# Patient Record
Sex: MALE | Race: WHITE | Employment: FULL TIME | URBAN - NONMETROPOLITAN AREA
[De-identification: names, ages, dates, MRNs, and addresses within clinical notes are randomized per-mention and may not be internally consistent; named-entity substitution may affect disease eponyms.]

---

## 2012-07-16 LAB — PROSTATE SPECIFIC ANTIGEN: 0.74 NG/ML

## 2012-10-15 LAB — PROSTATE SPECIFIC ANTIGEN: 0.75 NG/ML

## 2013-01-08 LAB — PROSTATE SPECIFIC ANTIGEN: 0.67 NG/ML

## 2013-07-03 LAB — PROSTATE SPECIFIC ANTIGEN: 0.74 NG/ML

## 2014-01-08 LAB — PROSTATE SPECIFIC ANTIGEN: 0.69 NG/ML

## 2015-01-06 LAB — PROSTATE SPECIFIC ANTIGEN: 0.77 NG/ML

## 2018-01-17 ENCOUNTER — TELEPHONE (OUTPATIENT)
Dept: UROLOGY | Age: 53
End: 2018-01-17

## 2018-01-17 DIAGNOSIS — N20.0 NEPHROLITHIASIS: Primary | ICD-10-CM

## 2018-01-17 NOTE — TELEPHONE ENCOUNTER
Patient notified and voiced understanding. KUB order faxed to [facility] [phone].

## 2018-01-30 ENCOUNTER — TELEPHONE (OUTPATIENT)
Dept: UROLOGY | Age: 53
End: 2018-01-30

## 2018-01-30 NOTE — TELEPHONE ENCOUNTER
Yanni Busby would like to talk to [facility] regarding this patient sometime today. He will be out of the office from noon till one otherwise anytime. Please call their office at [phone].   thanks

## 2018-02-19 ENCOUNTER — OFFICE VISIT (OUTPATIENT)
Dept: UROLOGY | Age: 53
End: 2018-02-19
Payer: COMMERCIAL

## 2018-02-19 VITALS
BODY MASS INDEX: 30.49 KG/M2 | DIASTOLIC BLOOD PRESSURE: 80 MMHG | SYSTOLIC BLOOD PRESSURE: 128 MMHG | WEIGHT: 213 LBS | HEIGHT: 70 IN

## 2018-02-19 DIAGNOSIS — R10.9 FLANK PAIN: Primary | ICD-10-CM

## 2018-02-19 DIAGNOSIS — N13.30 HYDRONEPHROSIS, UNSPECIFIED HYDRONEPHROSIS TYPE: ICD-10-CM

## 2018-02-19 DIAGNOSIS — N20.0 KIDNEY STONES: ICD-10-CM

## 2018-02-19 LAB
BILIRUBIN URINE: NEGATIVE
BLOOD URINE, POC: NORMAL
CHARACTER, URINE: CLEAR
COLOR, URINE: YELLOW
GLUCOSE URINE: NEGATIVE MG/DL
KETONES, URINE: NEGATIVE
LEUKOCYTE CLUMPS, URINE: NEGATIVE
NITRITE, URINE: NEGATIVE
PH, URINE: 5
PROTEIN, URINE: NEGATIVE MG/DL
SPECIFIC GRAVITY, URINE: 1.02 (ref 1–1.03)
UROBILINOGEN, URINE: 0.2 EU/DL

## 2018-02-19 PROCEDURE — 81003 URINALYSIS AUTO W/O SCOPE: CPT | Performed by: UROLOGY

## 2018-02-19 PROCEDURE — 99244 OFF/OP CNSLTJ NEW/EST MOD 40: CPT | Performed by: UROLOGY

## 2018-02-19 ASSESSMENT — ENCOUNTER SYMPTOMS
EYE REDNESS: 0
ABDOMINAL PAIN: 0
BACK PAIN: 1
EYE PAIN: 0
FACIAL SWELLING: 0
NAUSEA: 0
CHEST TIGHTNESS: 0
COLOR CHANGE: 0
SHORTNESS OF BREATH: 0

## 2018-02-23 LAB — CREAT SERPL-MCNC: 1.2 MG/DL

## 2018-03-01 ENCOUNTER — HOSPITAL ENCOUNTER (OUTPATIENT)
Dept: NUCLEAR MEDICINE | Age: 53
Discharge: HOME | End: 2018-03-01
Payer: COMMERCIAL

## 2018-03-01 DIAGNOSIS — R10.9 FLANK PAIN: ICD-10-CM

## 2018-03-01 PROCEDURE — 6360000002 HC RX W HCPCS

## 2018-03-01 PROCEDURE — 3430000000 HC RX DIAGNOSTIC RADIOPHARMACEUTICAL: Performed by: UROLOGY

## 2018-03-01 PROCEDURE — A9562 TC99M MERTIATIDE: HCPCS | Performed by: UROLOGY

## 2018-03-01 PROCEDURE — 78708 K FLOW/FUNCT IMAGE W/DRUG: CPT

## 2018-03-01 RX ORDER — FUROSEMIDE 10 MG/ML
40 INJECTION INTRAMUSCULAR; INTRAVENOUS ONCE
Status: COMPLETED | OUTPATIENT
Start: 2018-03-01 | End: 2018-03-01

## 2018-03-01 RX ADMIN — FUROSEMIDE 40 MG: 10 INJECTION INTRAMUSCULAR; INTRAVENOUS at 13:20

## 2018-03-01 RX ADMIN — Medication 10.9 MILLICURIE: at 13:10

## 2018-03-14 ENCOUNTER — OFFICE VISIT (OUTPATIENT)
Dept: UROLOGY | Age: 53
End: 2018-03-14
Payer: COMMERCIAL

## 2018-03-14 VITALS
SYSTOLIC BLOOD PRESSURE: 128 MMHG | HEIGHT: 70 IN | BODY MASS INDEX: 30.35 KG/M2 | WEIGHT: 212 LBS | DIASTOLIC BLOOD PRESSURE: 80 MMHG

## 2018-03-14 DIAGNOSIS — N20.0 KIDNEY STONE: Primary | ICD-10-CM

## 2018-03-14 LAB
BILIRUBIN URINE: NEGATIVE
BLOOD URINE, POC: NORMAL
CHARACTER, URINE: CLEAR
COLOR, URINE: YELLOW
GLUCOSE URINE: NEGATIVE MG/DL
KETONES, URINE: NEGATIVE
LEUKOCYTE CLUMPS, URINE: NEGATIVE
NITRITE, URINE: NEGATIVE
PH, URINE: 6
PROTEIN, URINE: NEGATIVE MG/DL
SPECIFIC GRAVITY, URINE: 1.02 (ref 1–1.03)
UROBILINOGEN, URINE: 0.2 EU/DL

## 2018-03-14 PROCEDURE — 99213 OFFICE O/P EST LOW 20 MIN: CPT | Performed by: NURSE PRACTITIONER

## 2018-03-14 PROCEDURE — 81003 URINALYSIS AUTO W/O SCOPE: CPT | Performed by: NURSE PRACTITIONER

## 2018-03-14 NOTE — PROGRESS NOTES
demonstrates no evidence of ureteral obstruction. Assessment & Plan  Possible R renal stone  R flank pain, resolved. R renal cysts  Mild left pelvocaliectasis    Pt's flank pain has resolved. Renal lasix scan without evidence of obstruction bilaterally. We discussed general stone prevention measures including increasing water intake to 3-4 liters per day to make 2.5 liters of urine per day, limiting animal protein intake to less than 9 ozs per day, watching sodium intake, and maintaining moderate calcium intake from food and not supplements with goal of 800-1200 mg/day. Pt provided with handout on kidney stone prevention. We discussed checking a litholink or repeat KUB in a year and pt declined at this time. F/u PRN.

## 2018-03-14 NOTE — PATIENT INSTRUCTIONS
resection of a large segment of bowel due to inflammatory bowel disease---you may have excessive oxalate absorption from the gut increasing your stone risk. Depending on the stone composition and or 24 hour urine tests, specific recommendations can be made to help prevent kidney stone formation in the future. · Obesity--Kidney stones are more common with obesity. Any weight reduction can be helpful. Do your best!!    Further testing:  · A 24 hour urine collection test called a Khadra Stewart may be ordered by your physician at your follow-up appointment in the office. This test analyzes the elements present in your urine that may increase your risk for kidney stone formation. It allows your medical provider to pinpoint what specific dietary changes or medicine changes can be made to prevent future stones. · Stone analysis can be done on stone fragments retrieved during your procedure or from fragments that you pass in your urine to aid in prevention of future stones.

## 2018-07-25 LAB
ALBUMIN SERPL-MCNC: 3 G/DL
ALP BLD-CCNC: 101 U/L
ALT SERPL-CCNC: 56 U/L
ANION GAP SERPL CALCULATED.3IONS-SCNC: 9 MMOL/L
AST SERPL-CCNC: 29 U/L
BILIRUB SERPL-MCNC: 1.1 MG/DL (ref 0.1–1.4)
BUN BLDV-MCNC: 14 MG/DL
CALCIUM SERPL-MCNC: 8.6 MG/DL
CHLORIDE BLD-SCNC: 108 MMOL/L
CO2: 29 MMOL/L
CREAT SERPL-MCNC: 1.4 MG/DL
GFR CALCULATED: NORMAL
GLUCOSE BLD-MCNC: 137 MG/DL
POTASSIUM SERPL-SCNC: 3.8 MMOL/L
PROSTATE SPECIFIC ANTIGEN: 29.12 NG/ML
SODIUM BLD-SCNC: 142 MMOL/L
TOTAL PROTEIN: 7

## 2018-08-01 LAB — PROSTATE SPECIFIC ANTIGEN: 10.06 NG/ML

## 2018-08-14 LAB — PROSTATE SPECIFIC ANTIGEN: 5.25 NG/ML

## 2018-09-17 LAB — PROSTATE SPECIFIC ANTIGEN: 5.38 NG/ML

## 2018-10-03 LAB
ALBUMIN SERPL-MCNC: 3.6 G/DL
ALP BLD-CCNC: 125 U/L
ALT SERPL-CCNC: 77 U/L
ANION GAP SERPL CALCULATED.3IONS-SCNC: 8 MMOL/L
AST SERPL-CCNC: 50 U/L
BILIRUB SERPL-MCNC: 0.7 MG/DL (ref 0.1–1.4)
BUN BLDV-MCNC: 12 MG/DL
CALCIUM SERPL-MCNC: 8.8 MG/DL
CHLORIDE BLD-SCNC: 103 MMOL/L
CHOLESTEROL, TOTAL: 173 MG/DL
CHOLESTEROL/HDL RATIO: NORMAL
CO2: 29 MMOL/L
CREAT SERPL-MCNC: 1.5 MG/DL
GFR CALCULATED: NORMAL
GLUCOSE BLD-MCNC: 126 MG/DL
HDLC SERPL-MCNC: 36 MG/DL (ref 35–70)
LDL CHOLESTEROL CALCULATED: 66 MG/DL (ref 0–160)
POTASSIUM SERPL-SCNC: 4.2 MMOL/L
PROSTATE SPECIFIC ANTIGEN: 3.43 NG/ML
SODIUM BLD-SCNC: 136 MMOL/L
TOTAL PROTEIN: 7.4
TRIGL SERPL-MCNC: 355 MG/DL
VLDLC SERPL CALC-MCNC: 71 MG/DL

## 2018-10-11 ENCOUNTER — TELEPHONE (OUTPATIENT)
Dept: UROLOGY | Age: 53
End: 2018-10-11

## 2018-10-23 ENCOUNTER — OFFICE VISIT (OUTPATIENT)
Dept: UROLOGY | Age: 53
End: 2018-10-23
Payer: COMMERCIAL

## 2018-10-23 VITALS
DIASTOLIC BLOOD PRESSURE: 80 MMHG | WEIGHT: 211 LBS | SYSTOLIC BLOOD PRESSURE: 126 MMHG | BODY MASS INDEX: 30.21 KG/M2 | HEIGHT: 70 IN

## 2018-10-23 DIAGNOSIS — R97.20 ELEVATED PSA: ICD-10-CM

## 2018-10-23 DIAGNOSIS — R39.15 URINARY URGENCY: Primary | ICD-10-CM

## 2018-10-23 DIAGNOSIS — R31.29 MICROSCOPIC HEMATURIA: ICD-10-CM

## 2018-10-23 LAB
BACTERIA: NORMAL
BILIRUBIN URINE: NEGATIVE
BILIRUBIN URINE: NEGATIVE
BLOOD URINE, POC: NORMAL
BLOOD, URINE: NEGATIVE
CASTS: NORMAL /LPF
CASTS: NORMAL /LPF
CHARACTER, URINE: CLEAR
CHARACTER, URINE: CLEAR
COLOR, URINE: YELLOW
COLOR: YELLOW
CRYSTALS: NORMAL
EPITHELIAL CELLS, UA: NORMAL /HPF
GLUCOSE URINE: NEGATIVE MG/DL
GLUCOSE, URINE: NEGATIVE MG/DL
KETONES, URINE: NEGATIVE
KETONES, URINE: NEGATIVE
LEUKOCYTE CLUMPS, URINE: NEGATIVE
LEUKOCYTE ESTERASE, URINE: NEGATIVE
MISCELLANEOUS LAB TEST RESULT: NORMAL
NITRITE, URINE: NEGATIVE
NITRITE, URINE: NEGATIVE
PH UA: 6
PH, URINE: 6.5
POST VOID RESIDUAL (PVR): 0 ML
PROTEIN UA: NEGATIVE MG/DL
PROTEIN, URINE: NEGATIVE MG/DL
RBC URINE: NORMAL /HPF
RENAL EPITHELIAL, UA: NORMAL
SPECIFIC GRAVITY UA: 1.02 (ref 1–1.03)
SPECIFIC GRAVITY, URINE: 1.02 (ref 1–1.03)
UROBILINOGEN, URINE: 0.2 EU/DL
UROBILINOGEN, URINE: 0.2 EU/DL
WBC UA: NORMAL /HPF
YEAST: NORMAL

## 2018-10-23 PROCEDURE — 81003 URINALYSIS AUTO W/O SCOPE: CPT | Performed by: NURSE PRACTITIONER

## 2018-10-23 PROCEDURE — 51798 US URINE CAPACITY MEASURE: CPT | Performed by: NURSE PRACTITIONER

## 2018-10-23 PROCEDURE — 99214 OFFICE O/P EST MOD 30 MIN: CPT | Performed by: NURSE PRACTITIONER

## 2019-02-05 ENCOUNTER — TELEPHONE (OUTPATIENT)
Dept: UROLOGY | Age: 54
End: 2019-02-05

## 2019-02-05 LAB — PROSTATE SPECIFIC ANTIGEN: 1.92 NG/ML

## 2019-04-09 LAB
ALBUMIN SERPL-MCNC: 3.7 G/DL
ALP BLD-CCNC: 105 U/L
ALT SERPL-CCNC: 79 U/L
ANION GAP SERPL CALCULATED.3IONS-SCNC: 10 MMOL/L
AST SERPL-CCNC: 47 U/L
BILIRUB SERPL-MCNC: 0.9 MG/DL (ref 0.1–1.4)
BUN BLDV-MCNC: 17 MG/DL
CALCIUM SERPL-MCNC: 9.3 MG/DL
CHLORIDE BLD-SCNC: 103 MMOL/L
CHOLESTEROL, TOTAL: 178 MG/DL
CHOLESTEROL/HDL RATIO: NORMAL
CO2: 28 MMOL/L
CREAT SERPL-MCNC: 1.2 MG/DL
GFR CALCULATED: 67
GLUCOSE BLD-MCNC: 116 MG/DL
HDLC SERPL-MCNC: 42 MG/DL (ref 35–70)
LDL CHOLESTEROL CALCULATED: 120 MG/DL (ref 0–160)
POTASSIUM SERPL-SCNC: 4.4 MMOL/L
PROSTATE SPECIFIC ANTIGEN: 2.29 NG/ML
SODIUM BLD-SCNC: 137 MMOL/L
TOTAL PROTEIN: 7.5
TRIGL SERPL-MCNC: 82 MG/DL
VLDLC SERPL CALC-MCNC: 16 MG/DL

## 2019-04-30 ENCOUNTER — OFFICE VISIT (OUTPATIENT)
Dept: UROLOGY | Age: 54
End: 2019-04-30
Payer: COMMERCIAL

## 2019-04-30 VITALS
BODY MASS INDEX: 30.95 KG/M2 | HEIGHT: 70 IN | DIASTOLIC BLOOD PRESSURE: 70 MMHG | SYSTOLIC BLOOD PRESSURE: 122 MMHG | WEIGHT: 216.2 LBS

## 2019-04-30 DIAGNOSIS — R39.15 URINARY URGENCY: Primary | ICD-10-CM

## 2019-04-30 DIAGNOSIS — R97.20 ELEVATED PSA: ICD-10-CM

## 2019-04-30 LAB
BILIRUBIN URINE: NEGATIVE
BLOOD URINE, POC: NEGATIVE
CHARACTER, URINE: CLEAR
COLOR, URINE: YELLOW
GLUCOSE URINE: NEGATIVE MG/DL
KETONES, URINE: NEGATIVE
LEUKOCYTE CLUMPS, URINE: NEGATIVE
NITRITE, URINE: NEGATIVE
PH, URINE: 7 (ref 5–9)
POST VOID RESIDUAL (PVR): 0 ML
PROTEIN, URINE: NEGATIVE MG/DL
SPECIFIC GRAVITY, URINE: 1.01 (ref 1–1.03)
UROBILINOGEN, URINE: 0.2 EU/DL (ref 0–1)

## 2019-04-30 PROCEDURE — 81003 URINALYSIS AUTO W/O SCOPE: CPT | Performed by: NURSE PRACTITIONER

## 2019-04-30 PROCEDURE — 99213 OFFICE O/P EST LOW 20 MIN: CPT | Performed by: NURSE PRACTITIONER

## 2019-04-30 PROCEDURE — 51798 US URINE CAPACITY MEASURE: CPT | Performed by: NURSE PRACTITIONER

## 2019-04-30 RX ORDER — LOSARTAN POTASSIUM 100 MG/1
100 TABLET ORAL DAILY
Refills: 1 | COMMUNITY
Start: 2019-03-28

## 2019-04-30 RX ORDER — AMLODIPINE BESYLATE 5 MG/1
5 TABLET ORAL DAILY
Refills: 0 | COMMUNITY
Start: 2019-04-15

## 2019-04-30 RX ORDER — LORATADINE 10 MG/1
10 TABLET ORAL DAILY
COMMUNITY

## 2019-04-30 NOTE — PROGRESS NOTES
4.00 years of use. He has never used smokeless tobacco. He reports that he does not drink alcohol or use drugs. Review of Systems  No problems with ears, nose or throat. No problems with eyes. No chest pain, shortness of breath, abdominal pain, extremity pain or weakness, and no neurological deficits. No rashes. No swollen glands or lymph nodes.  symptoms per HPI. The remainder of the review of symptoms is negative. Exam  There were no vitals taken for this visit. Nursing note and vitals reviewed. Constitutional: Alert and oriented times 3, no acute distress and cooperative to examination with appropriate mood and affect. HENT:   Head:        Normocephalic and atraumatic. Mouth/Throat:         Mucous membranes are normal.   Eyes:         EOM are normal. No scleral icterus. PERRLA. Neck:        Supple, symmetrical, trachea midline, no adenopathy, thyroid symmetric, not enlarged and no tenderness. Cardiovascular:        Normal rate, regular rhythm, S1 S2 heart sounds. No murmurs, rub, or gallops. Pulses:       Radial pulses are 2+/4 bilateral and equal. Posterior tibialis 2+/4 bilateral and equal  Pulmonary/Chest:      Chest symmetric with normal A/P diameter,  CTA with no wheezes, rales, or rhonchi noted. Normal respiratory rate and rhthym. No use of accessory muscles. Abdominal:         Soft. No tenderness. No rebound, no guarding and no CVA tenderness. Bowel sounds present. :  Prostate mild to moderately enlarged without nodule or induration  Musculoskeletal:         Normal range of motion. No edema or tenderness of lower extremities. Lymphadenopathy:        No cervical adenopathy. Bilateral supraclavicular adenopathy absent. Extremities: No cyanosis, clubbing, or edema present. Neurological:        Alert and oriented. No cranial nerve deficit. There are no focalizing motor or sensory deficits. CN II-XII are grossly intact.     Psychiatric:        Normal mood and affect. Labs   Urine dip demonstrates   No results found for this visit on 04/30/19. Patients recent PSA values are as follows  Lab Results   Component Value Date    PSA 2.29 04/09/2019    PSA 1.92 02/05/2019    PSA 3.43 10/03/2018            Recent BUN/Creatinine:  Lab Results   Component Value Date    BUN 17 04/09/2019    CREATININE 1.2 04/09/2019     Assessment & Plan  Prostatitis  Elevated PSA  R renal cysts    SYLVIE without nodule or induration. PSA down overall. Continue to follow every 6 months. F/u in 1 year with PVR. PSA every 6 months.

## 2024-01-15 ENCOUNTER — HOSPITAL ENCOUNTER (OUTPATIENT)
Dept: GENERAL RADIOLOGY | Age: 59
Discharge: HOME | End: 2024-01-15

## 2024-01-15 ENCOUNTER — HOSPITAL ENCOUNTER (OUTPATIENT)
Dept: CT IMAGING | Age: 59
Discharge: HOME | End: 2024-01-15
Attending: RADIOLOGY

## 2024-01-15 DIAGNOSIS — Z00.6 ENCOUNTER FOR EXAMINATION FOR NORMAL COMPARISON OR CONTROL IN CLINICAL RESEARCH PROGRAM: ICD-10-CM

## 2024-01-16 ENCOUNTER — TELEPHONE (OUTPATIENT)
Dept: PULMONOLOGY | Age: 59
End: 2024-01-16

## 2024-01-16 ENCOUNTER — OFFICE VISIT (OUTPATIENT)
Dept: PULMONOLOGY | Age: 59
End: 2024-01-16
Payer: COMMERCIAL

## 2024-01-16 VITALS
OXYGEN SATURATION: 98 % | BODY MASS INDEX: 31.81 KG/M2 | SYSTOLIC BLOOD PRESSURE: 128 MMHG | WEIGHT: 222.2 LBS | HEART RATE: 77 BPM | TEMPERATURE: 97.9 F | DIASTOLIC BLOOD PRESSURE: 72 MMHG | HEIGHT: 70 IN

## 2024-01-16 DIAGNOSIS — U07.1 PNEUMONIA DUE TO 2019-NCOV: ICD-10-CM

## 2024-01-16 DIAGNOSIS — R05.9 COUGH IN ADULT: Primary | ICD-10-CM

## 2024-01-16 DIAGNOSIS — R93.89 ABNORMAL CT OF THE CHEST: ICD-10-CM

## 2024-01-16 DIAGNOSIS — J12.82 PNEUMONIA DUE TO 2019-NCOV: ICD-10-CM

## 2024-01-16 PROCEDURE — 99204 OFFICE O/P NEW MOD 45 MIN: CPT | Performed by: INTERNAL MEDICINE

## 2024-01-16 RX ORDER — OMEPRAZOLE 40 MG/1
40 CAPSULE, DELAYED RELEASE ORAL DAILY
Qty: 30 CAPSULE | Refills: 3 | Status: SHIPPED | OUTPATIENT
Start: 2024-01-16 | End: 2024-05-15

## 2024-01-16 RX ORDER — BUDESONIDE AND FORMOTEROL FUMARATE DIHYDRATE 80; 4.5 UG/1; UG/1
2 AEROSOL RESPIRATORY (INHALATION) 2 TIMES DAILY
COMMUNITY

## 2024-01-16 RX ORDER — FLUTICASONE PROPIONATE 50 MCG
2 SPRAY, SUSPENSION (ML) NASAL DAILY
Qty: 16 G | Refills: 3 | Status: SHIPPED | OUTPATIENT
Start: 2024-01-16

## 2024-01-16 NOTE — PROGRESS NOTES
[facility].  Pulmonary medicine clinic initial consult note.      Patient: Gab Santos  : 1965      Chief complaint/[place]: Gab Santos is a 59 y.o. old male came for further evaluation regarding his chronic cough and obstructive sleep apnea with referral from Dr. Colin Mai MD    He is having cough: {YES/NO:::\"Yes\"}  Duration of cough: for {NUMBERS 1-30:100835209} days.   More than 8weeks: {YES/NO:::\"No\"}  History of post nasal drip: {YES/NO:::\"No\"}  History suggestive of GERD I.e bad tast in the mouth in the morning or heart burn: {YES/NO:::\"No\"}  History suggestive of aspiration/silent aspiration: {YES/NO:::\"No\"}  His cough is associated with sputum production: {YES/NO:::\"Yes\"}   The sputum color: {COLOR:0389646328::\"clear\"}  Hemoptysis:{YES/NO:::\"No\"}  Diurnal variation: None.     Worse {Time; morning/afternoon/evenin::\"in the morning\"}    Associated with fever: {YES/NO:::\"No\"}  Duration: {NUMBERS 0-12:57684} days.  Onset: {DESC; ONSET:5708}   Chills & rigors:{YES/NO:::\"No\"}  Associated night sweats: {YES/NO:::\"No\"}.  Recent travel history:{YES/NO:::\"No\"}.    Rrecent sick contacts: {YES/NO:::\"No\"}.   History of Atopy: {YES/NO:::\"No\"}.  History of exposure to occupational dust or chemicals:{YES/NO:::\"No\"}.       He is having shortness of breath:Yes  Onset: Gradual  Duration:{NUMBERS 0-12:41507}{DURATION:}.  Diurnal variation:  None.  Worse {Time; morning/afternoon/evenin::\"in the morning\"}  Functional status prior to beginning of symptoms: Distance he can walk on level ground: {NUMBERS; 100-1000 :85192} feet.  Current functional capacity on level ground: Distance he can walk on level ground: {NUMBERS; 100-1000 :42450} feet.  He can climb steps: {YES/NO:::\"No\"}  Flights of steps he can climb: {NUMBERS; 0-10:5044}  He gives a history of 
Neck Circumference -   17.5  Mallampati - 3    Lung Nodule Screening     [] Qualifies    [] Does not qualify   [] Declined    [] Completed  
NO  Claustrophobic: NO  MDD : NO  Blind: NO  Incontinent: NO  Para/Quadraplegi: NO.   Need transportation to and from Sleep Center:NO    Mallampati Score: 3   Neck Circumference:17.5 Inches.    He was diagnosed with obstructive sleep apnea in the past.  He did not want to go for reevaluation.  He is not interested in going for any treatment for his sleep apnea.    Social History:  Occupation:  He is current working: Yes  Type of profession: .                     Social History     Tobacco Use    Smoking status: Former     Current packs/day: 0.00     Types: Cigarettes     Start date: 1978     Quit date: 1982     Years since quittin.0    Smokeless tobacco: Never   Substance Use Topics    Alcohol use: No    Drug use: No       History of recreational or IV drug use in the past: NO  History of Alcohol use: No.       History of exposure to coal mines/coal dust: NO  History of exposure to foundry dust/welding: yes worked around automated welding for 22 years  History of exposure to quarry/silica/sandblasting: NO  History of exposure to asbestos/working with breaks/ships: NO  History of exposure to farm dust: NO  History of wood smoke exposure: Occasional. heats with indoor wood stove.   History of recent travel to long distances: NO  History of exposure to birds, pigeons, or chickens in the past:yes. Used to have chickens  Pet animals at home:Yes  Dogs: 3  Cats: 0    History of pulmonary embolism in the past: No            History of DVT in the past:No                               Review of Systems:   General/Constitutional: No recent loss of weight or appetite changes. No fever or chills.  HENT: Negative.   Eyes: Negative.  Upper respiratory tract: Occasional nasal stuffiness with post nasal drip.  Lower respiratory tract/ lungs: See HPI.  Cardiovascular: No palpitations or chest pain.  Gastrointestinal: No nausea or vomiting.  Neurological: No focal neurologiacal weakness.  Extremities: No

## 2024-01-16 NOTE — TELEPHONE ENCOUNTER
Pt is refusing testing at this time.  He is going to try and loose weight and treat the gerd and will F/U in 3 months @ [facility] office.

## 2024-04-26 RX ORDER — OMEPRAZOLE 40 MG/1
40 CAPSULE, DELAYED RELEASE ORAL DAILY
Qty: 30 CAPSULE | Refills: 0 | OUTPATIENT
Start: 2024-04-26

## 2024-04-30 RX ORDER — OMEPRAZOLE 40 MG/1
40 CAPSULE, DELAYED RELEASE ORAL DAILY
Qty: 30 CAPSULE | Refills: 0 | OUTPATIENT
Start: 2024-04-30

## 2024-05-14 RX ORDER — OMEPRAZOLE 40 MG/1
40 CAPSULE, DELAYED RELEASE ORAL DAILY
Qty: 30 CAPSULE | Refills: 0 | OUTPATIENT
Start: 2024-05-14

## 2024-05-14 NOTE — TELEPHONE ENCOUNTER
Received refill request for Omeprazole. Medication was last ordered by Dr. Grimm. Medication was last ordered on 1/16/24 with 1 refills.   Patient was last seen in the office 1/16/24. Patient has a scheduled follow up 5/22/24 w/Viktoriya.   Medication needs to be sent to [facility].

## 2024-05-22 ENCOUNTER — APPOINTMENT (OUTPATIENT)
Dept: CT IMAGING | Age: 59
End: 2024-05-22
Payer: COMMERCIAL

## 2024-05-22 ENCOUNTER — HOSPITAL ENCOUNTER (OUTPATIENT)
Age: 59
Setting detail: OBSERVATION
Discharge: HOME | End: 2024-05-24
Attending: HOSPITALIST | Admitting: HOSPITALIST
Payer: COMMERCIAL

## 2024-05-22 DIAGNOSIS — Z00.6 EXAMINATION FOR NORMAL COMPARISON FOR CLINICAL RESEARCH: ICD-10-CM

## 2024-05-22 DIAGNOSIS — R40.4 TRANSIENT ALTERATION OF AWARENESS: Primary | ICD-10-CM

## 2024-05-22 PROBLEM — R41.82 AMS (ALTERED MENTAL STATUS): Status: ACTIVE | Noted: 2024-05-22

## 2024-05-22 LAB
ANION GAP SERPL CALC-SCNC: 11 MEQ/L (ref 8–16)
BACTERIA: NORMAL
BASOPHILS ABSOLUTE: 0.1 THOU/MM3 (ref 0–0.1)
BASOPHILS NFR BLD AUTO: 1.2 %
BILIRUB UR QL STRIP: NEGATIVE
BUN SERPL-MCNC: 19 MG/DL (ref 7–22)
CALCIUM SERPL-MCNC: 9.7 MG/DL (ref 8.5–10.5)
CASTS #/AREA URNS LPF: NORMAL /LPF
CASTS #/AREA URNS LPF: NORMAL /LPF
CHARACTER UR: CLEAR
CHARCOAL URNS QL MICRO: NORMAL
CHLORIDE SERPL-SCNC: 99 MEQ/L (ref 98–111)
CK SERPL-CCNC: 547 U/L (ref 55–170)
CO2 SERPL-SCNC: 27 MEQ/L (ref 23–33)
COLOR UR: NORMAL
CREAT SERPL-MCNC: 1.1 MG/DL (ref 0.4–1.2)
CRYSTALS URNS QL MICRO: NORMAL
DEPRECATED RDW RBC AUTO: 44.1 FL (ref 35–45)
EKG ATRIAL RATE: 61 BPM
EKG P AXIS: 66 DEGREES
EKG P-R INTERVAL: 152 MS
EKG Q-T INTERVAL: 424 MS
EKG QRS DURATION: 122 MS
EKG QTC CALCULATION (BAZETT): 426 MS
EKG R AXIS: 87 DEGREES
EKG T AXIS: 67 DEGREES
EKG VENTRICULAR RATE: 61 BPM
EOSINOPHIL NFR BLD AUTO: 2.4 %
EOSINOPHILS ABSOLUTE: 0.1 THOU/MM3 (ref 0–0.4)
EPITHELIAL CELLS, UA: NORMAL /HPF
ERYTHROCYTE [DISTWIDTH] IN BLOOD BY AUTOMATED COUNT: 13.6 % (ref 11.5–14.5)
GFR SERPL CREATININE-BSD FRML MDRD: 77 ML/MIN/1.73M2
GLUCOSE BLD STRIP.AUTO-MCNC: 97 MG/DL (ref 70–108)
GLUCOSE SERPL-MCNC: 121 MG/DL (ref 70–108)
GLUCOSE UR QL STRIP.AUTO: NEGATIVE MG/DL
HCT VFR BLD AUTO: 45.8 % (ref 42–52)
HGB BLD-MCNC: 15.1 GM/DL (ref 14–18)
HGB UR QL STRIP.AUTO: NEGATIVE
IMM GRANULOCYTES # BLD AUTO: 0.03 THOU/MM3 (ref 0–0.07)
IMM GRANULOCYTES NFR BLD AUTO: 0.5 %
KETONES UR QL STRIP.AUTO: NEGATIVE
LEUKOCYTE ESTERASE UR QL STRIP.AUTO: NEGATIVE
LYMPHOCYTES ABSOLUTE: 1.2 THOU/MM3 (ref 1–4.8)
LYMPHOCYTES NFR BLD AUTO: 20.4 %
MCH RBC QN AUTO: 29.3 PG (ref 26–33)
MCHC RBC AUTO-ENTMCNC: 33 GM/DL (ref 32.2–35.5)
MCV RBC AUTO: 88.8 FL (ref 80–94)
MONOCYTES ABSOLUTE: 0.7 THOU/MM3 (ref 0.4–1.3)
MONOCYTES NFR BLD AUTO: 13.1 %
NEUTROPHILS ABSOLUTE: 3.6 THOU/MM3 (ref 1.8–7.7)
NEUTROPHILS NFR BLD AUTO: 62.4 %
NITRITE UR QL STRIP.AUTO: NEGATIVE
NRBC BLD AUTO-RTO: 0 /100 WBC
OSMOLALITY SERPL CALC.SUM OF ELEC: 277.3 MOSMOL/KG (ref 275–300)
PH UR STRIP.AUTO: 7 [PH] (ref 5–9)
PLATELET # BLD AUTO: 298 THOU/MM3 (ref 130–400)
PMV BLD AUTO: 10.1 FL (ref 9.4–12.4)
POTASSIUM SERPL-SCNC: 4 MEQ/L (ref 3.5–5.2)
PROLACTIN SERPL-MCNC: 9.1 NG/ML
PROT UR STRIP.AUTO-MCNC: NEGATIVE MG/DL
RBC # BLD AUTO: 5.16 MILL/MM3 (ref 4.7–6.1)
RBC #/AREA URNS HPF: NORMAL /HPF
RENAL EPI CELLS #/AREA URNS HPF: NORMAL /[HPF]
SODIUM SERPL-SCNC: 137 MEQ/L (ref 135–145)
SPECIFIC GRAVITY UA: 1.02 (ref 1–1.03)
TROPONIN, HIGH SENSITIVITY: 17 NG/L (ref 0–12)
TROPONIN, HIGH SENSITIVITY: 19 NG/L (ref 0–12)
TSH SERPL DL<=0.005 MIU/L-ACNC: 2.18 UIU/ML (ref 0.4–4.2)
UROBILINOGEN, URINE: 0.2 EU/DL (ref 0–1)
WBC # BLD AUTO: 5.7 THOU/MM3 (ref 4.8–10.8)
WBC #/AREA URNS HPF: NORMAL /HPF
YEAST LIKE FUNGI URNS QL MICRO: NORMAL

## 2024-05-22 PROCEDURE — 2580000003 HC RX 258

## 2024-05-22 PROCEDURE — 84484 ASSAY OF TROPONIN QUANT: CPT

## 2024-05-22 PROCEDURE — 36415 COLL VENOUS BLD VENIPUNCTURE: CPT

## 2024-05-22 PROCEDURE — G0378 HOSPITAL OBSERVATION PER HR: HCPCS

## 2024-05-22 PROCEDURE — 81001 URINALYSIS AUTO W/SCOPE: CPT

## 2024-05-22 PROCEDURE — 70450 CT HEAD/BRAIN W/O DYE: CPT

## 2024-05-22 PROCEDURE — 99223 1ST HOSP IP/OBS HIGH 75: CPT | Performed by: HOSPITALIST

## 2024-05-22 PROCEDURE — 93010 ELECTROCARDIOGRAM REPORT: CPT | Performed by: INTERNAL MEDICINE

## 2024-05-22 PROCEDURE — 95819 EEG AWAKE AND ASLEEP: CPT

## 2024-05-22 PROCEDURE — 82948 REAGENT STRIP/BLOOD GLUCOSE: CPT

## 2024-05-22 PROCEDURE — 99285 EMERGENCY DEPT VISIT HI MDM: CPT

## 2024-05-22 PROCEDURE — 80048 BASIC METABOLIC PNL TOTAL CA: CPT

## 2024-05-22 PROCEDURE — 84146 ASSAY OF PROLACTIN: CPT

## 2024-05-22 PROCEDURE — 6370000000 HC RX 637 (ALT 250 FOR IP)

## 2024-05-22 PROCEDURE — 95819 EEG AWAKE AND ASLEEP: CPT | Performed by: PSYCHIATRY & NEUROLOGY

## 2024-05-22 PROCEDURE — 84443 ASSAY THYROID STIM HORMONE: CPT

## 2024-05-22 PROCEDURE — 93005 ELECTROCARDIOGRAM TRACING: CPT | Performed by: NURSE PRACTITIONER

## 2024-05-22 PROCEDURE — 85025 COMPLETE CBC W/AUTO DIFF WBC: CPT

## 2024-05-22 PROCEDURE — 82550 ASSAY OF CK (CPK): CPT

## 2024-05-22 RX ORDER — ACETAMINOPHEN 650 MG/1
650 SUPPOSITORY RECTAL EVERY 6 HOURS PRN
Status: DISCONTINUED | OUTPATIENT
Start: 2024-05-22 | End: 2024-05-24 | Stop reason: HOSPADM

## 2024-05-22 RX ORDER — ONDANSETRON 2 MG/ML
4 INJECTION INTRAMUSCULAR; INTRAVENOUS EVERY 6 HOURS PRN
Status: DISCONTINUED | OUTPATIENT
Start: 2024-05-22 | End: 2024-05-24 | Stop reason: HOSPADM

## 2024-05-22 RX ORDER — ROSUVASTATIN CALCIUM 10 MG/1
5 TABLET, COATED ORAL NIGHTLY
Status: DISCONTINUED | OUTPATIENT
Start: 2024-05-22 | End: 2024-05-24 | Stop reason: HOSPADM

## 2024-05-22 RX ORDER — SODIUM CHLORIDE 9 MG/ML
INJECTION, SOLUTION INTRAVENOUS PRN
Status: DISCONTINUED | OUTPATIENT
Start: 2024-05-22 | End: 2024-05-24 | Stop reason: HOSPADM

## 2024-05-22 RX ORDER — POTASSIUM CHLORIDE 7.45 MG/ML
10 INJECTION INTRAVENOUS PRN
Status: DISCONTINUED | OUTPATIENT
Start: 2024-05-22 | End: 2024-05-24 | Stop reason: HOSPADM

## 2024-05-22 RX ORDER — ONDANSETRON 4 MG/1
4 TABLET, ORALLY DISINTEGRATING ORAL EVERY 8 HOURS PRN
Status: DISCONTINUED | OUTPATIENT
Start: 2024-05-22 | End: 2024-05-24 | Stop reason: HOSPADM

## 2024-05-22 RX ORDER — LOSARTAN POTASSIUM 100 MG/1
100 TABLET ORAL DAILY
Status: DISCONTINUED | OUTPATIENT
Start: 2024-05-23 | End: 2024-05-24 | Stop reason: HOSPADM

## 2024-05-22 RX ORDER — HYDROCHLOROTHIAZIDE 25 MG/1
25 TABLET ORAL DAILY
Status: DISCONTINUED | OUTPATIENT
Start: 2024-05-23 | End: 2024-05-24 | Stop reason: HOSPADM

## 2024-05-22 RX ORDER — ACETAMINOPHEN 325 MG/1
650 TABLET ORAL EVERY 6 HOURS PRN
Status: DISCONTINUED | OUTPATIENT
Start: 2024-05-22 | End: 2024-05-24 | Stop reason: HOSPADM

## 2024-05-22 RX ORDER — SODIUM CHLORIDE 0.9 % (FLUSH) 0.9 %
5-40 SYRINGE (ML) INJECTION PRN
Status: DISCONTINUED | OUTPATIENT
Start: 2024-05-22 | End: 2024-05-24 | Stop reason: HOSPADM

## 2024-05-22 RX ORDER — FLUTICASONE PROPIONATE 50 MCG
2 SPRAY, SUSPENSION (ML) NASAL DAILY
Status: DISCONTINUED | OUTPATIENT
Start: 2024-05-23 | End: 2024-05-24 | Stop reason: HOSPADM

## 2024-05-22 RX ORDER — ASPIRIN 81 MG/1
81 TABLET, CHEWABLE ORAL DAILY
Status: DISCONTINUED | OUTPATIENT
Start: 2024-05-22 | End: 2024-05-24 | Stop reason: HOSPADM

## 2024-05-22 RX ORDER — SODIUM CHLORIDE 0.9 % (FLUSH) 0.9 %
5-40 SYRINGE (ML) INJECTION EVERY 12 HOURS SCHEDULED
Status: DISCONTINUED | OUTPATIENT
Start: 2024-05-22 | End: 2024-05-24 | Stop reason: HOSPADM

## 2024-05-22 RX ORDER — POLYETHYLENE GLYCOL 3350 17 G/17G
17 POWDER, FOR SOLUTION ORAL DAILY PRN
Status: DISCONTINUED | OUTPATIENT
Start: 2024-05-22 | End: 2024-05-24 | Stop reason: HOSPADM

## 2024-05-22 RX ORDER — POTASSIUM CHLORIDE 20 MEQ/1
40 TABLET, EXTENDED RELEASE ORAL PRN
Status: DISCONTINUED | OUTPATIENT
Start: 2024-05-22 | End: 2024-05-24 | Stop reason: HOSPADM

## 2024-05-22 RX ORDER — MAGNESIUM SULFATE IN WATER 40 MG/ML
2000 INJECTION, SOLUTION INTRAVENOUS PRN
Status: DISCONTINUED | OUTPATIENT
Start: 2024-05-22 | End: 2024-05-24 | Stop reason: HOSPADM

## 2024-05-22 RX ADMIN — SODIUM CHLORIDE, PRESERVATIVE FREE 10 ML: 5 INJECTION INTRAVENOUS at 21:13

## 2024-05-22 RX ADMIN — ROSUVASTATIN 5 MG: 10 TABLET, FILM COATED ORAL at 21:13

## 2024-05-22 ASSESSMENT — PAIN - FUNCTIONAL ASSESSMENT: PAIN_FUNCTIONAL_ASSESSMENT: NONE - DENIES PAIN

## 2024-05-22 NOTE — PROCEDURES
PROCEDURE NOTE  Date: 5/22/2024   Name: Gab Santos  YOB: 1965    Procedures            Date: 5/22/2024  Referring physician: Dr. Chan    Indication  Patient aged 59 y with seizures. EEG done to assess for epileptiform activity.    Introduction  This routine 20-minute EEG was recorded using the [employer] 10-20 System on a [employer] workstation at 256 samples/s. Automated spike and seizure detection algorithms were applied.    Description  During the maximal alert state, a well-regulated, symmetric, and reactive 9 Hz posterior dominant rhythm was seen. No consistent focal slowing or interhemispheric asymmetry was noted. Stage I and stage II sleep were observed. There were few left anterior temporal sharps F7>T3.    Activations  Hyperventilation was not performed. Intermittent photic stimulation was performed and demonstrated no posterior driving response.    Impression  Abnormal awake EEG. The presence of left anterior temporal sharps increases patient risk for focal seizures.       EKG lead did not show clear arrhythmia, if still in concern consider formal EKG or correlation with telemetry.        Solange King MD  Epilepsy Board Certified.  Neurology Board Certified.    Electronically Signed

## 2024-05-22 NOTE — ED TRIAGE NOTES
Pt comes to ED with c/o altered mental status. Pt states that this has been happening since 2022. PT states he gets little episodes where he feels like he has had too much to drink and then he is out of it and then comes back. Pt states it is on and off. Pt states he came in today because the episodes have been becoming more frequent. Pt states he feels a head change and then comes back. Pt states he gets dizzy with the head change. PT denies nausea. PT denies trouble walking. Pt states in this last episode he did not remember conversations and went in to a blank stare. Pt denies slurred speech or one sided weakness and numbness. EKG completed.

## 2024-05-22 NOTE — ED NOTES
Pt to ED from pulmonology for c/c confusion. Wife states episodes of confusion ongoing intermittently for 2 years. Wife states during these episodes long term memory is only intact,states this is third time in past 3 days. Wife states typically does not occur but once q 6 months. MA from pulmonology states after appt pt began to become confused and was unable to tell date and time. Pt currently alert,states no memory of coming from office. EKG completed.

## 2024-05-22 NOTE — PROGRESS NOTES
[facility]     Neurodiagnostic Laboratory Technician Worksheet      EEG Date: 2024    Name: Gab Santos  : 1965  Age: 59 y.o.  Sex: male  MRN: [id number]  CSN: [id number]    Room/Location:   Ordering Provider: MINESH Baldwin    EEG Number: [id number]    Time In: 1427  Time Out: 1447  Total Treatment Time: 20 mins    Clinical History: pt having episodes of altered mental status, usually after waking up, but wife said they're getting more frequent and during the day. Lasting about 5-10 mins.     Hand Dominance: Right   Sedation: No   H.V. Completed: Yes,with poor effort   Photic: Yes   Sleep: Yes   Drowsy: Yes   Sleep Deprived: No   Seizures Observed: No   Mentality: alert     Technician: Fareed Pruett 2024

## 2024-05-22 NOTE — ED NOTES
Pt transported to [facility]. Floor contacted prior to transport, spoke to Madina.

## 2024-05-22 NOTE — ED NOTES
ED to inpatient nurses report      Chief Complaint:  Chief Complaint   Patient presents with    Altered Mental Status     Present to ED from: home    MOA:     LOC: alert and orientated to name, place, date  Mobility: Requires assistance * 1  Oxygen Baseline:     Current needs required: RA     Code Status:   No Order    What abnormal results were found and what did you give/do to treat them? See chart   Any procedures or intervention occur? AMS    Mental Status:  Level of Consciousness: Alert (0)    Psych Assessment:        Vitals:  Patient Vitals for the past 24 hrs:   BP Temp Temp src Pulse Resp SpO2 Height Weight   05/22/24 1211 (!) 128/90 -- -- 61 12 95 % -- --   05/22/24 1113 131/79 98.2 °F (36.8 °C) Oral 62 18 97 % 1.778 m (5' 10\") 93 kg (205 lb)        LDAs:   Peripheral IV 05/22/24 Right Antecubital (Active)   Site Assessment Clean, dry & intact 05/22/24 1204   Line Status Blood return noted;Flushed;Normal saline locked;Specimen collected 05/22/24 1204   Dressing Status Clean, dry & intact 05/22/24 1204   Dressing Type Transparent 05/22/24 1204   Dressing Intervention New 05/22/24 1204       Ambulatory Status:  No data recorded    Diagnosis:  DISPOSITION Admitted 05/22/2024 01:14:03 PM   Final diagnoses:   Transient alteration of awareness        Consults:  IP CONSULT TO NEUROLOGY     Pain Score:  Pain Assessment  Pain Assessment: None - Denies Pain    C-SSRS:   Risk of Suicide: No Risk    Sepsis Screening:  Sepsis Risk Score: 0.5    [place] Fall Risk:       Swallow Screening        Preferred Language:   English      ALLERGIES     Ciprofloxacin    SURGICAL HISTORY       Past Surgical History:   Procedure Laterality Date    LIVER BIOPSY      TOE SURGERY         PAST MEDICAL HISTORY       Past Medical History:   Diagnosis Date    Elevated LFTs     Hypertension     Kidney stones 2018           Electronically signed by Hayley Dhillon RN on 5/22/2024 at 1:45 PM

## 2024-05-22 NOTE — H&P
Internal Medicine Resident History and Physical          Name: Gab Santos, male, : 1965, MRN: [id number]    PCP: Colin Mai MD    Date of Admission: 2024  Date of Service: Pt seen/examined on 24  and Admitted to Observation with expected LOS less than two midnights due to medical therapy.         Assessment and Plan:  Questionable seizure-like activity: Patient endorses recurrent episodes where he has a flat affect and zones out.  Patient is aware and fully conscious, however typically cannot respond.  These episodes last a few minutes and when he comes to he is confused.  He has had head imaging done at [facility].  He has been seen by pulmonology to rule out sleep apnea as the cause.  These episodes have become more frequent in nature occurring every day for the last 3 days.  Patient reports lightheadedness over the last few days as well.  Patient and his wife also endorse increased stressors over the last 3 weeks.  Most recent episode occurred today while at pul clinic, blood glucose at the time was 135.  Workup in ED grossly negative.  TSH WNL.  Neurology was consulted by ED who recommended hobs admission for EEG.  Head CT ordered  4-hour EEG  Orthostatic vitals  Neurology consult, appreciate recommendations  Telemetry monitoring  Elevated troponin: Troponin in ED 19.  EKG demonstrates normal sinus rhythm with no ischemic changes.  Patient denies chest pain.  Patient has no cardiac history.  Does take baby aspirin for unknown reason.  Repeat troponin  Hypertension: No known changes in home medications recently.  Takes losartan 100 mg daily and HCTZ 25 mg daily.  Continue with hold parameters in place.  HLD: Patient takes Crestor 5 mg, continue.  GERD: Continue Prilosec 40 mg daily.      =======================================================================      Chief Complaint: Intermittent episodes of AMS    History Of Present Illness:  Gab Santos is a 59 y.o. male with PMHx of

## 2024-05-22 NOTE — ED PROVIDER NOTES
[facility] Emergency Department    CHIEF COMPLAINT       Chief Complaint   Patient presents with    Altered Mental Status       Nurses Notes reviewed and I agree except as noted in the HPI.    HISTORY OF PRESENT ILLNESS   Gab Santos is a 59 y.o. male who presents to the ED for evaluation of altered mental status.  Patient reports today while at a pulmonology visit he suddenly had a transient change in awareness.  Wife was with him, notes that he suddenly had a flat affect, and was repeating the same question over and over.  She notes he has been having these episodes over the last several months, but they normally occur immediately after he woke up.  He was referred to pulmonology to rule out sleep apnea.  He notes over the last 6 days he has had these episodes frequently during the day.  He notes he is a , he would go to perform one of his tasks, and see that it was already done.  Apparently he had already done it and he does not remember doing it.  He notes from today when this episode happened in the pulmonology clinic he does not remember when he lost consciousness, and he remembers gaining consciousness when they said that he was coming to the ER.  Wife notes that he was suffering from this for approximately 30 minutes.  Wife currently notes patient's at his baseline.  Patient denies headaches, denies any aura before this happens.  Denies any visual changes.  Denies headaches dizziness lightheadedness.  Denies any chest pain or palpitations.  Denies any numbness or weakness in any of the extremities.  Patient does note he has a past medical history of COVID-19, and notes that he had some long COVID symptoms.  Patient wife reports patient has been worked up for this, at another facility, has completed CTs and MRIs that were negative for any acute findings.  Patient denies a history of seizures      HPI was provided by the patient.         PAST MEDICAL HISTORY     Past Medical History:   Diagnosis  Date    Elevated LFTs     Hypertension     Kidney stones 2018       SURGICALHISTORY      has a past surgical history that includes Toe Surgery and liver biopsy.    CURRENT MEDICATIONS       Previous Medications    APPLE CIDER VINEGAR 500 MG TABS    Apple Cider Vinegar Active 0 PO .COMPLEX March 16th, 2020 12:00am PO ;    ASPIRIN 1 MG/ML SUSP    Aspirin Aspirin Active MG PO 0 March 16th, 2020 11:29am Oral 03-  [facility] ([id number])    FLUTICASONE (FLONASE) 50 MCG/ACT NASAL SPRAY    2 sprays by Each Nostril route daily    HYDROCHLOROTHIAZIDE (HYDRODIURIL) 25 MG TABLET    Take 1 tablet by mouth daily    LOSARTAN (COZAAR) 100 MG TABLET    Take 1 tablet by mouth daily    OMEPRAZOLE (PRILOSEC) 40 MG DELAYED RELEASE CAPSULE    Take 1 capsule by mouth daily    ROSUVASTATIN (CRESTOR) 5 MG TABLET    Rosuvastatin (Crestor) 5 mg tablet Active 5 MG PO DAILY 90 March 24th, 2023 12:00am take at bedtime    SUPREP BOWEL PREP KIT 17.5-3.13-1.6 GM/177ML SOLN SOLUTION    Take 1 kit by mouth See Admin Instructions Use as directed    VITAMIN D (CHOLECALCIFEROL) 125 MCG (5000 UT) CAPS CAPSULE    125 mcg       ALLERGIES     is allergic to ciprofloxacin.    FAMILY HISTORY     He indicated that his mother is alive. He indicated that his father is alive. He indicated that all of his three sisters are alive. He indicated that the status of his maternal grandfather is unknown. He indicated that the status of his neg hx is unknown. He indicated that the status of his maternal cousin is unknown.   family history includes Cancer (age of onset: 78) in his father; Cirrhosis in his maternal grandfather; Diabetes in his maternal grandfather and mother; Heart Disease in his father, mother, sister, and sister; High Blood Pressure in his father, mother, sister, and sister; Other in his father, maternal cousin, and mother; Prostate Cancer (age of onset: 72) in his father.    SOCIAL HISTORY       Social History     Socioeconomic History

## 2024-05-22 NOTE — ED NOTES
Pt is resting in cot with respirations even and unlabored. No distress noted. Call light is within reach. Wife at bedside states that they have been under some stress lately with a family member passing and pt trying to get a promotion at work. Wife is not sure if the loss of memory is getting worse due to stress. No other concerns voiced at this time.

## 2024-05-23 ENCOUNTER — APPOINTMENT (OUTPATIENT)
Dept: MRI IMAGING | Age: 59
End: 2024-05-23
Payer: COMMERCIAL

## 2024-05-23 ENCOUNTER — APPOINTMENT (OUTPATIENT)
Dept: MRI IMAGING | Age: 59
End: 2024-05-23
Attending: RADIOLOGY
Payer: COMMERCIAL

## 2024-05-23 DIAGNOSIS — G40.209 PARTIAL COMPLEX SEIZURE DISORDER WITHOUT INTRACTABLE EPILEPSY (HCC): Primary | ICD-10-CM

## 2024-05-23 LAB
ANION GAP SERPL CALC-SCNC: 9 MEQ/L (ref 8–16)
BUN SERPL-MCNC: 21 MG/DL (ref 7–22)
CALCIUM SERPL-MCNC: 9.3 MG/DL (ref 8.5–10.5)
CHLORIDE SERPL-SCNC: 104 MEQ/L (ref 98–111)
CO2 SERPL-SCNC: 25 MEQ/L (ref 23–33)
CREAT SERPL-MCNC: 1.2 MG/DL (ref 0.4–1.2)
DEPRECATED RDW RBC AUTO: 44.1 FL (ref 35–45)
ERYTHROCYTE [DISTWIDTH] IN BLOOD BY AUTOMATED COUNT: 13.5 % (ref 11.5–14.5)
GFR SERPL CREATININE-BSD FRML MDRD: 69 ML/MIN/1.73M2
GLUCOSE SERPL-MCNC: 100 MG/DL (ref 70–108)
HCT VFR BLD AUTO: 46.5 % (ref 42–52)
HGB BLD-MCNC: 15.4 GM/DL (ref 14–18)
MCH RBC QN AUTO: 29.6 PG (ref 26–33)
MCHC RBC AUTO-ENTMCNC: 33.1 GM/DL (ref 32.2–35.5)
MCV RBC AUTO: 89.3 FL (ref 80–94)
PLATELET # BLD AUTO: 298 THOU/MM3 (ref 130–400)
PMV BLD AUTO: 10.1 FL (ref 9.4–12.4)
POTASSIUM SERPL-SCNC: 4.1 MEQ/L (ref 3.5–5.2)
RBC # BLD AUTO: 5.21 MILL/MM3 (ref 4.7–6.1)
SODIUM SERPL-SCNC: 138 MEQ/L (ref 135–145)
WBC # BLD AUTO: 7.2 THOU/MM3 (ref 4.8–10.8)

## 2024-05-23 PROCEDURE — 6370000000 HC RX 637 (ALT 250 FOR IP): Performed by: PHYSICIAN ASSISTANT

## 2024-05-23 PROCEDURE — 85027 COMPLETE CBC AUTOMATED: CPT

## 2024-05-23 PROCEDURE — A9579 GAD-BASE MR CONTRAST NOS,1ML: HCPCS | Performed by: PHYSICIAN ASSISTANT

## 2024-05-23 PROCEDURE — 80048 BASIC METABOLIC PNL TOTAL CA: CPT

## 2024-05-23 PROCEDURE — 2580000003 HC RX 258

## 2024-05-23 PROCEDURE — G0378 HOSPITAL OBSERVATION PER HR: HCPCS

## 2024-05-23 PROCEDURE — 70553 MRI BRAIN STEM W/O & W/DYE: CPT

## 2024-05-23 PROCEDURE — 6370000000 HC RX 637 (ALT 250 FOR IP)

## 2024-05-23 PROCEDURE — 6360000004 HC RX CONTRAST MEDICATION: Performed by: PHYSICIAN ASSISTANT

## 2024-05-23 PROCEDURE — 36415 COLL VENOUS BLD VENIPUNCTURE: CPT

## 2024-05-23 PROCEDURE — 99223 1ST HOSP IP/OBS HIGH 75: CPT | Performed by: PHYSICIAN ASSISTANT

## 2024-05-23 PROCEDURE — 99233 SBSQ HOSP IP/OBS HIGH 50: CPT | Performed by: STUDENT IN AN ORGANIZED HEALTH CARE EDUCATION/TRAINING PROGRAM

## 2024-05-23 RX ORDER — LAMOTRIGINE 25 MG/1
25 TABLET ORAL 2 TIMES DAILY
Status: DISCONTINUED | OUTPATIENT
Start: 2024-05-23 | End: 2024-05-24 | Stop reason: HOSPADM

## 2024-05-23 RX ADMIN — LOSARTAN POTASSIUM 100 MG: 100 TABLET, FILM COATED ORAL at 09:48

## 2024-05-23 RX ADMIN — LAMOTRIGINE 25 MG: 25 TABLET ORAL at 16:29

## 2024-05-23 RX ADMIN — LAMOTRIGINE 25 MG: 25 TABLET ORAL at 20:22

## 2024-05-23 RX ADMIN — ROSUVASTATIN 5 MG: 10 TABLET, FILM COATED ORAL at 20:22

## 2024-05-23 RX ADMIN — SODIUM CHLORIDE, PRESERVATIVE FREE 10 ML: 5 INJECTION INTRAVENOUS at 09:54

## 2024-05-23 RX ADMIN — GADOTERIDOL 20 ML: 279.3 INJECTION, SOLUTION INTRAVENOUS at 19:10

## 2024-05-23 RX ADMIN — SODIUM CHLORIDE, PRESERVATIVE FREE 10 ML: 5 INJECTION INTRAVENOUS at 20:23

## 2024-05-23 RX ADMIN — FLUTICASONE PROPIONATE 2 SPRAY: 50 SPRAY, METERED NASAL at 09:48

## 2024-05-23 RX ADMIN — ASPIRIN 81 MG 81 MG: 81 TABLET ORAL at 09:48

## 2024-05-23 RX ADMIN — HYDROCHLOROTHIAZIDE 25 MG: 25 TABLET ORAL at 09:48

## 2024-05-23 ASSESSMENT — ENCOUNTER SYMPTOMS
ALLERGIC/IMMUNOLOGIC NEGATIVE: 1
EYES NEGATIVE: 1
GASTROINTESTINAL NEGATIVE: 1
RESPIRATORY NEGATIVE: 1

## 2024-05-23 NOTE — PROGRESS NOTES
Patient is 59 years old right-handed man presented to the hospital after an event where he was staring out into space and unresponsive with eyes open but no evidence of automatic movements.  This has been happening to him more frequently in the past 4 weeks up to several of the spells every week.  Patient does not have any aura.  He thinks that the total period Of confusion is about 40 minutes.  Some of these events have been witnessed by the wife and description was also obtained from the wife.  A brain MRI was already done few days ago.  It was reported to be normal.  I reviewed the images however I think there is findings suggestive of left medial temporal sclerosis with slightly enlarged temporal horn of the left lateral ventricle and slightly prominent signal of the temporal lobes.    I discussed with the patient my impression that that he has complex partial seizures and recommend obtaining Lamictal starter kit.  Seizure precautions were discussed with the patient.  Patient will follow-up as an outpatient with neurology

## 2024-05-23 NOTE — PROGRESS NOTES
Pt states he had the feeling he has been getting before his episodes. He describes it as feeling tipsy. Pt stated he did not have an episode but definitely experienced the pre episode feeling. Will continue to monitor. Electronically signed by Chante Short RN on 5/23/2024 at 7:25 AM

## 2024-05-23 NOTE — PROGRESS NOTES
losartan 100 mg daily and hydrochlorothiazide 25 mg daily with hold parameters  Hyperlipidemia: Continue Crestor 5 mg daily  GERD: Continue Prilosec 40 mg daily        Expected discharge date: 5/24/2024    Disposition:   [x] Home  [] Inpatient Rehab  [] Psychiatric Unit  [] SNF  [] Long Term Care Facility  [] Other-    ===================================================================      Chief Complaint: Intermittent episodes of lightheadedness    Hospital Course:    Gab Santos is a 59 y.o. male with PMHx of hypertension, HLD, GERD who presents to [facility] after an episode of altered mental status.  Patient endorses recurrent episodes where he has a flat affect and zones out.  Patient is aware and fully conscious, however typically cannot respond.  First episode occurred in 2022 and patient went to emergency department with a negative workup.  These episodes used to occur once every 6 to 9 months.  These episodes last a few minutes and when he comes to he is confused.  He has had head imaging done at [facility].  He has been seen by pulmonology to rule out sleep apnea as the cause.  These episodes have become more frequent in nature occurring every day for the last 3 days.  Patient reports lightheadedness over the last few days as well.  Patient and his wife also endorse increased stressors over the last 3 weeks.  Most recent episode occurred today while at pul clinic, blood glucose at the time was 135.  Workup in ED grossly negative.  TSH WNL.  Neurology was consulted by ED who recommended [facility] admission for EEG.     5/23/2024: Brain MRI reviewed by neurology who believe findings are suggestive of left medial temporal sclerosis with slightly enlarged temporal horn of left lateral ventricle and slightly prominent signal of the temporal lobes.  Started on Lamictal starter kit.  Will need to follow-up with neurology outpatient.  Neurology would like a MRI with contrast prior to  104   CO2 27 25   BUN 19 21   CREATININE 1.1 1.2   CALCIUM 9.7 9.3     No results for input(s): \"AST\", \"ALT\", \"BILIDIR\", \"BILITOT\", \"ALKPHOS\" in the last 72 hours.  No results for input(s): \"INR\" in the last 72 hours.  No results for input(s): \"TROPONINT\" in the last 72 hours.  No results for input(s): \"PROCAL\" in the last 72 hours.   Lab Results   Component Value Date/Time    NITRU NEGATIVE 05/22/2024 12:00 PM    WBCUA NONE SEEN 05/22/2024 12:00 PM    BACTERIA NONE SEEN 05/22/2024 12:00 PM    RBCUA 0-2 05/22/2024 12:00 PM    BLOODU NEGATIVE 05/22/2024 12:00 PM    GLUCOSEU NEGATIVE 05/22/2024 12:00 PM       Radiology: CT HEAD WO CONTRAST    Result Date: 5/22/2024  PROCEDURE: CT HEAD WO CONTRAST CLINICAL INFORMATION: Intermittent altered mental status. COMPARISON: No prior study. TECHNIQUE: Noncontrast 5 mm axial images were obtained through the brain. Sagittal and coronal reconstructions were obtained. All CT scans at this facility use dose modulation, iterative reconstruction, and/or weight-based dosing when appropriate to reduce radiation dose to as low as reasonably achievable. FINDINGS: There is no hemorrhage. There are no intra-or extra-axial collections.  There is no hydrocephalus, midline shift or mass effect.  The gray-white matter differentiation is preserved. The paranasal sinuses and mastoid air cells are normally aerated.  There is no suspicious calvarial abnormality.       No evidence of an acute process. **This report has been created using voice recognition software. It may contain minor errors which are inherent in voice recognition technology.**    see assessment and plan for discussion of pertinent imaging.     LDA: []CVC / []PICC / []Midline / []Heller / []Drains / []Mediport / [x]None  Antibiotics: No  Steroids: No  Labs (still needed?): []Yes / [x]No  IVF (still needed?): []Yes / [x]No  Level of care: []Step Down / [x]Med-Surg  Bed Status: [x]Inpatient / []Observation  Telemetry: [x]Yes / []No

## 2024-05-23 NOTE — CONSULTS
CREATININE 1.1 1.2   GLUCOSE 121* 100     COAGULATION STUDIES:No results for input(s): \"PROTIME\", \"INR\", \"APTT\" in the last 72 hours.  LIVER PROFILE:No results for input(s): \"AST\", \"ALT\", \"BILIDIR\", \"BILITOT\", \"ALKPHOS\" in the last 72 hours.  CHOLESTEROL AND A1C:No results for input(s): \"HDL\", \"CHOL\", \"TRIG\", \"LABA1C\" in the last 720 hours.    Invalid input(s): \"LDLCALC\"   Imaging Last 24 Hours:  CT HEAD WO CONTRAST    Result Date: 5/22/2024  PROCEDURE: CT HEAD WO CONTRAST CLINICAL INFORMATION: Intermittent altered mental status. COMPARISON: No prior study. TECHNIQUE: Noncontrast 5 mm axial images were obtained through the brain. Sagittal and coronal reconstructions were obtained. All CT scans at this facility use dose modulation, iterative reconstruction, and/or weight-based dosing when appropriate to reduce radiation dose to as low as reasonably achievable. FINDINGS: There is no hemorrhage. There are no intra-or extra-axial collections.  There is no hydrocephalus, midline shift or mass effect.  The gray-white matter differentiation is preserved. The paranasal sinuses and mastoid air cells are normally aerated.  There is no suspicious calvarial abnormality.       No evidence of an acute process. **This report has been created using voice recognition software. It may contain minor errors which are inherent in voice recognition technology.**        Assessment and Plan:        - Transient episodes of altered awareness with short-term memory loss  - abnormal EEG  - Mesial temporal sclerosis on MRI  - h/o htn, hpl, GERD    Plan:  - start lamictal titration  - No driving x 6mths  - Refer to Dr. Johnson outpatient neurology, next available appt  - Awaiting repeat MRI w/s contrast  - Seizure precautions were reviewed with patient including but not limited to no swimming alone, no use of power equipment until seizure free, avoid climbing ladders etc.  - ok to dc when MRI completed and medically stable, lamictal

## 2024-05-23 NOTE — PLAN OF CARE
Problem: Discharge Planning  Goal: Discharge to home or other facility with appropriate resources  Outcome: Progressing  Flowsheets (Taken 5/22/2024 2045)  Discharge to home or other facility with appropriate resources: Identify barriers to discharge with patient and caregiver     Problem: Safety - Adult  Goal: Free from fall injury  Outcome: Progressing  Flowsheets (Taken 5/22/2024 2045)  Free From Fall Injury: Instruct family/caregiver on patient safety     Problem: Neurosensory - Adult  Goal: Achieves stable or improved neurological status  Outcome: Progressing  Goal: Achieves maximal functionality and self care  Outcome: Progressing

## 2024-05-23 NOTE — PROGRESS NOTES
Patient states he had an  several episode \"tipsy feeling\" states that it lasted for 10-15 seconds  no confusion noted. States remembers not like yesterday were they did not remember and was confused.

## 2024-05-24 VITALS
HEIGHT: 70 IN | SYSTOLIC BLOOD PRESSURE: 125 MMHG | WEIGHT: 205 LBS | TEMPERATURE: 97.7 F | OXYGEN SATURATION: 97 % | HEART RATE: 71 BPM | DIASTOLIC BLOOD PRESSURE: 79 MMHG | RESPIRATION RATE: 16 BRPM | BODY MASS INDEX: 29.35 KG/M2

## 2024-05-24 PROCEDURE — 99232 SBSQ HOSP IP/OBS MODERATE 35: CPT | Performed by: PHYSICIAN ASSISTANT

## 2024-05-24 PROCEDURE — G0378 HOSPITAL OBSERVATION PER HR: HCPCS

## 2024-05-24 PROCEDURE — 6370000000 HC RX 637 (ALT 250 FOR IP)

## 2024-05-24 PROCEDURE — 2580000003 HC RX 258

## 2024-05-24 PROCEDURE — 6370000000 HC RX 637 (ALT 250 FOR IP): Performed by: PHYSICIAN ASSISTANT

## 2024-05-24 RX ADMIN — FLUTICASONE PROPIONATE 2 SPRAY: 50 SPRAY, METERED NASAL at 08:13

## 2024-05-24 RX ADMIN — LAMOTRIGINE 25 MG: 25 TABLET ORAL at 08:13

## 2024-05-24 RX ADMIN — HYDROCHLOROTHIAZIDE 25 MG: 25 TABLET ORAL at 08:13

## 2024-05-24 RX ADMIN — ASPIRIN 81 MG 81 MG: 81 TABLET ORAL at 08:13

## 2024-05-24 RX ADMIN — LOSARTAN POTASSIUM 100 MG: 100 TABLET, FILM COATED ORAL at 08:13

## 2024-05-24 RX ADMIN — SODIUM CHLORIDE, PRESERVATIVE FREE 10 ML: 5 INJECTION INTRAVENOUS at 08:12

## 2024-05-24 NOTE — DISCHARGE SUMMARY
Resident Discharge Summary (Hospitalist)      Patient Identification:   Gab Santos   : 1965  MRN: [id number]   Account: [id number]   Patient's PCP: Colin Mai MD    Admit Date: 2024   Discharge Date: 2024      Admitting Physician: Bobo Chan MD  Discharge Physician: Teddy Mccurdy DO       Discharge Diagnoses:  Suspect complex partial seizures:Patient endorses recurrent episodes where he has a flat affect and zones out. Patient is aware and fully conscious, however typically cannot respond. These episodes last a few minutes and when he comes to he is confused. He has had head imaging done at [facility]. He has been seen by pulmonology to rule out sleep apnea as the cause. These episodes have become more frequent in nature occurring every day for the last 3 days. Patient reports lightheadedness over the last few days as well. Patient and his wife also endorse increased stressors over the last 3 weeks. Most recent episode occurred today while at pul clinic, blood glucose at the time was 135. Workup in ED grossly negative. TSH WNL. Neurology was consulted by ED who recommended admission for EEG.  MRI reviewed by neurology who found findings suggestive of left medial temporal sclerosis with slightly enlarged temporal horn of left lateral ventricle and slightly prominent signal of left temporal lobes.  EEG shows presence of left anterior temporal sharps increases patient risk for focal seizures.  MRI with contrast 2024 showed probable ischemic changes in the white matter.  No evidence of acute infarct.  No definitive structural abnormality noted in the temporal lobes.  Retention cyst or polyp in the right sphenoid sinus.  Mild inflammatory changes in the right mastoid air cells  Started on Lamictal per neurology  No driving for 6 months  Will follow-up with Dr. Johnson outpatient neurology  Elevated troponin: Likely type II NSTEMI troponin in ED 19 repeat troponin 17.  EKG

## 2024-05-24 NOTE — PROGRESS NOTES
Neurology Progress Note    Date:5/24/2024       Room:[facility]006-  Patient Name:Gab Santos     YOB: 1965     Age:59 y.o.    CC:   Chief Complaint   Patient presents with    Altered Mental Status        Subjective    Gab Santos is a 59 y.o. male with a history of htn, hpl, GERD etc who presented to the ER from a pulmonology appt after a sudden change in awareness. His wife states that they were getting up to leave the office when the patient starting staring. He didn't respond to her for a period of time and then began repeating what she was saying. He doesn't recall this period of time. The first thing patient remembers is getting into the wheelchair. Total time of episode est 30min then patient back to baseline. In questioning, patient states he's been having periods of time that he loses since 2022. They have increased in frequency. EEG shows L temporal sharps. Patient denies h/o seizure or head trauma. Review of MRI brain with Dr. Vance shows mesial temporal sclerosis. Currently, patient denies complaint. Wife is at bedside and corroborates story.     Interval history 5/24/24:  No acute overnight events. Patient denies complaint. MRI completed and without acute changes. Contrasted study without visible lesions.    Review of Systems   12pt ROS completed, pertinent positives listed in HPI otherwise negative    Medications   Scheduled Meds:    lamoTRIgine  25 mg Oral BID    sodium chloride flush  5-40 mL IntraVENous 2 times per day    fluticasone  2 spray Each Nostril Daily    hydroCHLOROthiazide  25 mg Oral Daily    losartan  100 mg Oral Daily    rosuvastatin  5 mg Oral Nightly    aspirin  81 mg Oral Daily     Continuous Infusions:    sodium chloride       PRN Meds: sodium chloride flush, sodium chloride, potassium chloride **OR** potassium alternative oral replacement **OR** potassium chloride, magnesium sulfate, ondansetron **OR** ondansetron, polyethylene glycol, acetaminophen **OR**  acetaminophen    Past History    Past Medical History:   has a past medical history of Complex partial epilepsy with recurrent seizures (HCC), Elevated LFTs, Hypertension, Kidney stones, and Mesial temporal sclerosis.    Social History:   reports that he quit smoking about 42 years ago. He started smoking about 46 years ago. He has never used smokeless tobacco. He reports that he does not drink alcohol and does not use drugs.     Family History:   Family History   Problem Relation Age of Onset    Heart Disease Mother     Diabetes Mother     High Blood Pressure Mother     Other Mother         colitis    Heart Disease Father     Prostate Cancer Father 72    High Blood Pressure Father     Other Father         sleep apnea    Cancer Father 78        skin ca'     Heart Disease Sister     High Blood Pressure Sister     Heart Disease Sister     High Blood Pressure Sister     Other Maternal Cousin     Cirrhosis Maternal Grandfather         Non alcoholic    Diabetes Maternal Grandfather     Colon Cancer Neg Hx        Physical Examination      Vitals:  /79   Pulse 71   Temp 97.7 °F (36.5 °C) (Oral)   Resp 16   Ht 1.778 m (5' 10\")   Wt 93 kg (205 lb)   SpO2 97%   BMI 29.41 kg/m²   Temp (24hrs), Av °F (36.7 °C), Min:97.7 °F (36.5 °C), Max:98.3 °F (36.8 °C)      I/O (24Hr):    Intake/Output Summary (Last 24 hours) at 2024 1111  Last data filed at 2024 1105  Gross per 24 hour   Intake 970 ml   Output --   Net 970 ml       Physical Exam  General: well-developed, well-nourished, appears comfortable  HENT: NCAT, Anicteric sclera, mucous membranes moist, trachea midline  Chest: CTA, On RA, no wheezing or rhonchi  Heart: RRR, no murmurs, rubs or gallops, extremities warm and dry  ABD: soft, nt/nd, +BS x 4  Skin: dry, no visible wounds, No visible rashes    Neurologic Exam   A&O x 3, able to name objects and follows 1&2 step commands without difficulty  PERRL, EOMi  Face activates symmetrically, tongue

## 2024-05-24 NOTE — PROGRESS NOTES
Discharge instructions gone over with patient. Patient in stable condition and awaiting ride. IV and telemetry removed.

## 2024-05-24 NOTE — PLAN OF CARE
Problem: Discharge Planning  Goal: Discharge to home or other facility with appropriate resources  Outcome: Progressing  Flowsheets (Taken 5/23/2024 2015)  Discharge to home or other facility with appropriate resources: Identify barriers to discharge with patient and caregiver     Problem: Safety - Adult  Goal: Free from fall injury  Outcome: Progressing  Flowsheets (Taken 5/23/2024 2015)  Free From Fall Injury: Instruct family/caregiver on patient safety     Problem: Neurosensory - Adult  Goal: Achieves stable or improved neurological status  Outcome: Progressing  Flowsheets (Taken 5/23/2024 2015)  Achieves stable or improved neurological status: Assess for and report changes in neurological status  Goal: Achieves maximal functionality and self care  Outcome: Progressing  Flowsheets (Taken 5/23/2024 2015)  Achieves maximal functionality and self care: Monitor swallowing and airway patency with patient fatigue and changes in neurological status

## 2024-05-28 RX ORDER — OMEPRAZOLE 40 MG/1
40 CAPSULE, DELAYED RELEASE ORAL DAILY
Qty: 30 CAPSULE | Refills: 3 | OUTPATIENT
Start: 2024-05-28 | End: 2024-09-25

## 2024-05-28 NOTE — TELEPHONE ENCOUNTER
Patients Wife Alma Rosa called in stating patient needs a refill of patients Omeprazole.   Last ordered 1/16/2024  Last visit 1/16/2024  Next visit none currently scheduled.

## 2024-06-14 ENCOUNTER — TELEPHONE (OUTPATIENT)
Dept: PULMONOLOGY | Age: 59
End: 2024-06-14

## 2024-06-14 NOTE — TELEPHONE ENCOUNTER
LM to try to schedule Pt for his HST F/U. It was done on 5/10/24 and he has not had an appt since.  Needs [facility] either Dr EDGE or Mady.